# Patient Record
(demographics unavailable — no encounter records)

---

## 2021-07-14 NOTE — XRAY REPORT
CHEST 1 VIEW



INDICATION: 

sepsis.



COMPARISON: 

7/15/2020.



FINDINGS:



Support devices: None.

Heart: Normal. 

Lungs/Pleura: No acute pulmonary or pleural findings.  







IMPRESSION:

1. No acute findings.



Signer Name: Zack Villa MD 

Signed: 7/14/2021 3:50 PM

Workstation Name: [id number]

## 2021-07-14 NOTE — CAT SCAN REPORT
CT head/brain wo con



INDICATION:

fall, unsteady gait.



TECHNIQUE:

All CT scans at this location are performed using CT dose reduction for ALARA by means of automated e
xposure control. 



COMPARISON:

None available.



FINDINGS:

There is no evidence of hemorrhage, hydrocephalus, brain edema, or mass effect/mass lesion. There is 
chronic encephalomalacia in the anterior left temporal lobe likely due to previous trauma. Remainder 
the brain otherwise appears normal for age.



The included paranasal sinuses and mastoid air cells are clear. The orbits appear unremarkable.



IMPRESSION:

1. No acute intracranial abnormality. 



Signer Name: Kenneth Garcia MD 

Signed: 7/14/2021 3:34 PM

Workstation Name: [id number]

## 2021-07-14 NOTE — HISTORY AND PHYSICAL REPORT
History of Present Illness


Date of examination: 07/14/21


Date of admission: 


07/14/21 17:10





Chief complaint: 





High blood glucose levels


History of present illness: 


34-year-old male with history of insulin-dependent diabetes and severe 

noncompliance lives in a group home.  Patient is a very poor historian.  Patient

was sent for high blood glucose levels.  In the emergency room patient was found

to have unsteady gait which has been there for more than a year.  Patient has a 

shuffling gait.  Patient stated that several years been walking for the last 1 

to 1-1/2 years.  Smokes about a pack a day.  He has numbness and tingling in 

both hands and feet.  No fever or chills.  Has poor dentition.








- Past Medical History


--Diabetes: Yes


Additional medical history: Left perianal abscess





- Surgical History


Additional Surgical History: Incision and drainage perianal abscess





- Social History


Smoking Status: Current Every Day Smoker





- Medications


Home Medications: 


                                Home Medications











 Medication  Instructions  Recorded  Confirmed  Last Taken  Type


 


Balsalazide Disodium [Colazal] 750 mg PO TID 06/01/20 07/02/20 Unknown History


 


Potassium Chloride [K-Dur] 10 meq PO BID 06/01/20 07/02/20 Unknown History


 


ursodioL [Ursodiol] 300 mg PO BID 06/01/20 07/02/20 Unknown History


 


Acetaminophen [Acetaminophen TAB] 650 mg PO Q4H PRN  tablet 06/05/20 07/02/20 

Unknown Rx


 


Zinc Oxide 1 applic TP BID #7 tube 06/13/20 07/02/20 Unknown Rx


 


oxyCODONE /ACETAMINOPHEN [Percocet 1 tab PO Q6H PRN #14 tablet 07/05/20  Unknown

 Rx





5/325 mg]     


 


Potassium Chloride 20 meq PO QDAY #14 packet 07/15/20  Unknown Rx


 


Acetaminophen [Acetaminophen TAB] 650 mg PO Q4H PRN  tablet 08/19/20  Unknown Rx


 


Insulin Detemir [Levemir VIAL] 25 unit SQ QHS #1 vial 08/19/20  Unknown Rx


 


Insulin NPH/Regular [NovoLIN 70/30] 4 unit SUB-Q BID #1 vial 08/19/20  Unknown 

Rx


 


Loperamide [Imodium] 2 mg PO TID #90 capsule 08/19/20  Unknown Rx


 


Magnesium Oxide [Mag-Ox] 400 mg PO QDAY #14 tablet 08/19/20  Unknown Rx


 


Nicotine [Habitrol] 7 mg TD QDAY #30 patch 08/19/20  Unknown Rx


 


OLANzapine [ZyPREXA] 5 mg PO DAILY #30 tablet 08/19/20  Unknown Rx


 


Tamsulosin [Flomax] 0.4 mg PO QDAY #30 cap 08/19/20  Unknown Rx














Review of Systems


ROS: 


Stated complaint: GENERAL ILLNESS


Other details as noted in HPI





Constitutional: denies: chills, fever


Eyes: denies: vision change


ENT: dental pain.  denies: throat pain, congestion


Respiratory: cough, shortness of breath


Cardiovascular: denies: chest pain, palpitations, syncope


Gastrointestinal: denies: abdominal pain, nausea, vomiting, diarrhea


Genitourinary: denies: dysuria, frequency


Musculoskeletal: denies: back pain, joint swelling


Skin: lesions


Neurological: paresthesias, abnormal gait.  denies: headache, weakness, numbness








Medications and Allergies


                                    Allergies











Allergy/AdvReac Type Severity Reaction Status Date / Time


 


Sulfa (Sulfonamide Allergy  Rash Verified 07/07/20 09:28





Antibiotics)     











                                Home Medications











 Medication  Instructions  Recorded  Confirmed  Last Taken  Type


 


Balsalazide Disodium [Colazal] 750 mg PO TID 06/01/20 07/15/21 07/13/21 History


 


Potassium Chloride [K-Dur] 10 meq PO BID 06/01/20 07/15/21 07/13/21 History


 


ursodioL [Ursodiol] 300 mg PO BID 06/01/20 07/15/21 07/13/21 History


 


Acetaminophen [Acetaminophen TAB] 650 mg PO Q4H PRN  tablet 06/05/20 07/15/21 07

/13/21 Rx


 


Zinc Oxide 1 applic TP BID #7 tube 06/13/20 07/15/21 07/15/21 03:49 Rx


 


oxyCODONE /ACETAMINOPHEN [Percocet 1 tab PO Q6H PRN #14 tablet 07/05/20 07/15/21

 07/13/21 Rx





5/325 mg]     


 


Potassium Chloride 20 meq PO QDAY #14 packet 07/15/20 07/15/21 07/13/21 Rx


 


Acetaminophen [Acetaminophen TAB] 650 mg PO Q4H PRN  tablet 08/19/20 07/15/21 

07/13/21 Rx


 


Insulin Detemir [Levemir VIAL] 25 unit SQ QHS #1 vial 08/19/20 07/15/21 07/13/21

Rx


 


Insulin NPH/Regular [NovoLIN 70/30] 4 unit SUB-Q BID #1 vial 08/19/20 07/15/21 

07/12/21 Rx


 


Loperamide [Imodium] 2 mg PO TID #90 capsule 08/19/20 07/15/21 07/13/21 Rx


 


Magnesium Oxide [Mag-Ox] 400 mg PO QDAY #14 tablet 08/19/20 07/15/21 07/13/21 Rx


 


Nicotine [Habitrol] 7 mg TD QDAY #30 patch 08/19/20 07/15/21 07/13/21 Rx


 


OLANzapine [ZyPREXA] 5 mg PO DAILY #30 tablet 08/19/20 07/15/21 07/13/21 Rx


 


Tamsulosin [Flomax] 0.4 mg PO QDAY #30 cap 08/19/20 07/15/21 07/13/21 Rx











Active Meds: 


Active Medications





Dextrose (Dextrose 50% In Water (25gm) 50 Ml Syringe)  50 ml IV Q30MIN PRN; 

Protocol


   PRN Reason: Hypoglycemia


Insulin Human Regular (Insulin Regular, Human 100 Units/1 Ml)  0 units SUB-Q 

[facility]; Protocol


   Last Admin: 07/14/21 20:05 Dose:  Not Given


   Documented by: 











Exam





- Constitutional


Vitals: 


                                        











Temp Pulse Resp BP Pulse Ox


 


 98.9 F   87   19   112/78   99 


 


 07/14/21 20:51  07/14/21 20:51  07/14/21 20:51  07/14/21 20:51  07/14/21 20:51











General appearance: Present: no acute distress, well-nourished





- EENT


Eyes: Present: PERRL


ENT: hearing intact, clear oral mucosa





- Neck


Neck: Present: supple, normal ROM





- Respiratory


Respiratory effort: normal


Respiratory: bilateral: CTA





- Cardiovascular


Heart rate: 78


Rhythm: regular


Heart Sounds: Present: S1 & S2.  Absent: rub, click





- Extremities


Extremities: pulses symmetrical, No edema


Peripheral Pulses: within normal limits





- Abdominal


General gastrointestinal: Present: soft, non-tender, non-distended, normal bowel

 sounds


Male genitourinary: Present: normal





- Integumentary


Integumentary: Present: clear, warm, dry





- Musculoskeletal


Musculoskeletal: strength equal bilaterally, generalized weakness





- Psychiatric


Psychiatric: appropriate mood/affect, intact judgment & insight





- Neurologic


Neurologic: CNII-XII intact, moves all extremities, other (Abnormal 

gait--shuffles but not ataxic)





- Allied Health


Allied health notes reviewed: nursing, case management





HEART Score





- HEART Score


Troponin: 


                                        











Troponin T  < 0.010 ng/mL (0.00-0.029)   07/14/21  14:15    














Results





- Labs


CBC & Chem 7: 


                                 07/14/21 14:15





                                 07/14/21 14:15


Labs: 


                             Laboratory Last Values











WBC  13.8 K/mm3 (4.5-11.0)  H  07/14/21  14:15    


 


RBC  4.87 M/mm3 (3.65-5.03)   07/14/21  14:15    


 


Hgb  15.1 gm/dl (11.8-15.2)   07/14/21  14:15    


 


Hct  42.1 % (35.5-45.6)   07/14/21  14:15    


 


MCV  87 fl (84-94)   07/14/21  14:15    


 


MCH  31 pg (28-32)   07/14/21  14:15    


 


MCHC  36 % (32-34)  H  07/14/21  14:15    


 


RDW  16.0 % (13.2-15.2)  H  07/14/21  14:15    


 


Plt Count  154 K/mm3 (140-440)   07/14/21  14:15    


 


Lymph % (Auto)  11.7 % (13.4-35.0)  L  07/14/21  14:15    


 


Mono % (Auto)  4.6 % (0.0-7.3)   07/14/21  14:15    


 


Eos % (Auto)  0.7 % (0.0-4.3)   07/14/21  14:15    


 


Baso % (Auto)  0.3 % (0.0-1.8)   07/14/21  14:15    


 


Lymph # (Auto)  1.6 K/mm3 (1.2-5.4)   07/14/21  14:15    


 


Mono # (Auto)  0.6 K/mm3 (0.0-0.8)   07/14/21  14:15    


 


Eos # (Auto)  0.1 K/mm3 (0.0-0.4)   07/14/21  14:15    


 


Baso # (Auto)  0.0 K/mm3 (0.0-0.1)   07/14/21  14:15    


 


Seg Neutrophils %  82.7 % (40.0-70.0)  H  07/14/21  14:15    


 


Seg Neutrophils #  11.4 K/mm3 (1.8-7.7)  H  07/14/21  14:15    


 


PT  13.3 Sec. (12.2-14.9)   07/14/21  14:15    


 


INR  0.96  (0.87-1.13)   07/14/21  14:15    


 


APTT  25.5 Sec. (24.2-36.6)   07/14/21  14:15    


 


VBG pH  7.438  (7.320-7.420)  H  07/14/21  14:41    


 


Sodium  132 mmol/L (137-145)  L  07/14/21  14:15    


 


Potassium  3.3 mmol/L (3.6-5.0)  L  07/14/21  14:15    


 


Chloride  87.0 mmol/L ()  L  07/14/21  14:15    


 


Carbon Dioxide  32 mmol/L (22-30)  H  07/14/21  14:15    


 


Anion Gap  16 mmol/L  07/14/21  14:15    


 


BUN  12 mg/dL (9-20)   07/14/21  14:15    


 


Creatinine  0.5 mg/dL (0.8-1.3)  L  07/14/21  14:15    


 


Estimated GFR  > 60 ml/min  07/14/21  14:15    


 


BUN/Creatinine Ratio  24 %  07/14/21  14:15    


 


Glucose  439 mg/dL ()  H  07/14/21  14:15    


 


POC Glucose  401 mg/dL ()  H  07/14/21  21:17    


 


Lactic Acid  0.90 mmol/L (0.7-2.0)   07/14/21  17:08    


 


Calcium  9.5 mg/dL (8.4-10.2)   07/14/21  14:15    


 


Magnesium  1.10 mg/dL (1.7-2.3)  L  07/14/21  14:15    


 


Total Bilirubin  1.00 mg/dL (0.1-1.2)   07/14/21  14:15    


 


AST


  295 units/L (5-40)  H  07/14/21  14:15    


 


ALT  226 units/L (7-56)  H  07/14/21  14:15    


 


Alkaline Phosphatase  1367 units/L ()  H  07/14/21  14:15    


 


Troponin T  < 0.010 ng/mL (0.00-0.029)   07/14/21  14:15    


 


Total Protein  7.3 g/dL (6.3-8.2)   07/14/21  14:15    


 


Albumin  3.7 g/dL (3.9-5)  L  07/14/21  14:15    


 


Albumin/Globulin Ratio  1.0 %  07/14/21  14:15    


 


Lipase  28 units/L (13-60)   07/14/21  14:15    


 


Urine Color  Straw  (Yellow)   07/14/21  19:22    


 


Urine Turbidity  Clear  (Clear)   07/14/21  19:22    


 


Urine pH  7.0  (5.0-7.0)   07/14/21  19:22    


 


Ur Specific Gravity  1.022  (1.003-1.030)   07/14/21  19:22    


 


Urine Protein  <15 mg/dl mg/dL (Negative)   07/14/21  19:22    


 


Urine Glucose (UA)  >=500 mg/dL (Negative)   07/14/21  19:22    


 


Urine Ketones  Tr mg/dL (Negative)   07/14/21  19:22    


 


Urine Blood  Neg  (Negative)   07/14/21  19:22    


 


Urine Nitrite  Neg  (Negative)   07/14/21  19:22    


 


Urine Bilirubin  Neg  (Negative)   07/14/21  19:22    


 


Urine Urobilinogen  < 2.0 mg/dL (<2.0)   07/14/21  19:22    


 


Ur Leukocyte Esterase  Neg  (Negative)   07/14/21  19:22    


 


Urine WBC (Auto)  < 1.0 /HPF (0.0-6.0)   07/14/21  19:22    


 


Urine RBC (Auto)  1.0 /HPF (0.0-6.0)   07/14/21  19:22    


 


Urine Opiates Screen  Negative   07/14/21  19:22    


 


Urine Methadone Screen  Negative   07/14/21  19:22    


 


Ur Barbiturates Screen  Negative   07/14/21  19:22    


 


Ur Phencyclidine Scrn  Negative   07/14/21  19:22    


 


Ur Amphetamines Screen  Negative   07/14/21  19:22    


 


U Benzodiazepines Scrn  Negative   07/14/21  19:22    


 


Urine Cocaine Screen  Positive   07/14/21  19:22    


 


U Marijuana (THC) Screen  Negative   07/14/21  19:22    


 


Drugs of Abuse Note  Disclamer   07/14/21  19:22    











Microbiology: 


Microbiology





07/14/21 14:15   Peripheral/Venous   Blood Culture - Preliminary


                            Culture in Progress


07/14/21 14:15   Peripheral/Venous   Blood Culture - Preliminary


                            Culture in Progress











- Imaging and Cardiology


Imaging and Cardiology: 





Chest x-ray


No acute findings





Head CT


No acute intracranial abnormalities





Cervical C-spine


No acute fracture or subluxation of the spine and neutral position





Abdominal ultrasound


There is sludge in the lumen of the gallbladder


The common bile duct is in the upper limits of normal in diameter


There is no definitive sonographic evidence of cholecystitis.





Assessment and Plan


Advance Directives: Yes (Full code)


VTE prophylaxis?: Chemical


Plan of care discussed with patient/family: Yes





- Patient Problems


(1) Hyperosmolar hyperglycemic state (HHS)


Current Visit: Yes   Status: Acute   


Plan to address problem: 


Patient is very noncompliant


Humalog at treatment doses and high-dose sliding scale coverage


Initiated on insulin twice a day


Check a hemoglobin A1c








(2) Abnormality of gait


Current Visit: Yes   Status: Chronic   


Plan to address problem: 


Reason is unclear


LS findings CT scan requested


Neuro consult requested


CAT scan of the C-spine is normal








(3) Hypokalemia


Current Visit: Yes   Status: Acute   


Plan to address problem: 


Supplemented








(4) Hypomagnesemia


Current Visit: Yes   Status: Acute   


Plan to address problem: 


Supplemented








(5) Transaminitis


Current Visit: Yes   Status: Acute   


Plan to address problem: 


Check hepatitis profile








(6) Cocaine dependence


Current Visit: Yes   Status: Chronic   


Qualifiers: 


   Substance use status: uncomplicated   Qualified Code(s): F14.20 - Cocaine 

dependence, uncomplicated   


Plan to address problem: 


Patient counseled








(7) DVT prophylaxis


Current Visit: Yes   Status: Acute   


Plan to address problem: 


On heparin and GI prophylaxis

## 2021-07-14 NOTE — CAT SCAN REPORT
CT CERVICAL SPINE WITHOUT CONTRAST



INDICATION:

fall, unsteady gait.



TECHNIQUE:

Axial CT images of the spine were obtained. Sagittal and coronal reformatted images were produced. Al
l CT scans at this location are performed using CT dose reduction for ALARA by means of automated exp
osure control. 



COMPARISON:

None available.



FINDINGS:

ACUTE FRACTURE(S) OR SUBLUXATION: None.



SPINAL DEGENERATIVE CHANGES: No significant degenerative changes.



PARASPINAL SOFT TISSUES: No soft tissue swelling or other acute abnormalities. 



ADDITIONAL FINDINGS: No significant additional findings.



IMPRESSION:

1. No acute fracture or subluxation in the spine in neutral position.



Signer Name: Kenneth Garcia MD 

Signed: 7/14/2021 3:35 PM

Workstation Name: [id number]

## 2021-07-14 NOTE — ULTRASOUND REPORT
ULTRASOUND ABDOMEN, LIMITED (RIGHT UPPER QUADRANT)



INDICATION:

RUQ, r/o cholecystitis.



COMPARISON:

CT scan dated 6/8/2020



FINDINGS:

Pancreas: Not well seen. Visualized portion shows no significant abnormality.

Liver: Normal.

Gallbladder: Sludge is noted in the lumen of the gallbladder.  The gallbladder wall is normal in thic
kness. No pericholecystic fluid is seen.

Bile ducts: Upper limits of normal Common Bile Duct measures 6.6 mm. 



Free fluid: None.

Additional Findings: Incidental note is made of an extrarenal pelvis in the right kidney. This is see
n on the prior CT.



IMPRESSION:

1. There is sludge in the lumen of the gallbladder. The common bile duct is upper limits of normal in
 diameter. There is no definitive sonographic evidence of cholecystitis. If cholecystitis remains a c
linical concern, nuclear medicine HIDA scan can be obtained to further evaluate.



Signer Name: Klaus June MD 

Signed: 7/14/2021 6:30 PM

Workstation Name: [id number]

## 2021-07-14 NOTE — EMERGENCY DEPARTMENT REPORT
HPI





- General


Chief Complaint: Hyperglycemia


Time Seen by Provider: 07/14/21 13:10





- HPI


HPI: 





34-year-old male with history of insulin-dependent diabetes mellitus and several

mental illnesses including schizophrenia who is a gentile of the [facility] brought in b

y EMS from his group home at the request of his court appointed guardian due to 

multiple problems she is noted.  His blood sugars have been too high to read for

the past week or so.  Over that period of time he has had an unsteady gait and 

has fallen several times including yesterday.  She is also concerned that he has

developed neuropathy because he reports numbness in his fingers and toes.  When 

I spoke to the patient initially, he indicated he did not want to be examined or

worked up in any way.  He would not allow me to inspect his feet or hands or 

perform any kind of work-up and insisted that he was okay.





After speaking to the patient's guardian, Carmela Lambert on the phone at 1:24 PM, 

she stated that she agrees he should not be forced to undergo evaluation against

his will.  





We contacted risk management and I spoke with Kaykay Townsend at 1:49 PM.  She 

expressed agreement that we should not force anyone to undergo examination or 

work-up against their will, even if they are a gentile of the [facility].





When I went into the room to speak to the patient again at 1:53 PM, Lucia, the 

ER manager was present and the patient is now change his mind and states he is 

okay with being examined and fully worked up and treated.  He reports that he 

has had an unsteady gait for the past week and has been falling frequently.  He 

also reports dental pain which has been ongoing for a year.  He does state that 

for the past week he has had some shortness of breath and cough greater than 

normal.  He is a cigarette smoker and smokes 1 pack/day.  He says he has 

numbness/tingling in his bilateral fingertips and toes.  He denies any 

fever/chills, headache, vision change, neck pain, back pain, chest pain, 

abdominal pain, nausea/vomiting, dysuria, focal weakness, or any other 

complaints.





ED Past Medical Hx





- Past Medical History


Hx Hypertension: No


Hx Heart Attack/AMI: No


Hx Congestive Heart Failure: No


Hx Diabetes: Yes


Hx Deep Vein Thrombosis: No


Hx Pulmonary Embolism: No


Hx Liver Disease: No


Hx Renal Disease: No


Hx Sickle Cell Disease: No


Hx Arthritis: No


Hx Seizures: No


Hx Asthma: No


Hx COPD: No


Hx HIV: No


Additional medical history: Left perianal abscess





- Surgical History


Hx Pacemaker: No


Hx Internal Defibrillator: No


Hx Cholecystectomy: No


Hx Appendectomy: No


Additional Surgical History: Incision and drainage perianal abscess





- Social History


Smoking Status: Current Every Day Smoker





- Medications


Home Medications: 


                                Home Medications











 Medication  Instructions  Recorded  Confirmed  Last Taken  Type


 


Balsalazide Disodium [Colazal] 750 mg PO TID 06/01/20 07/02/20 Unknown History


 


Potassium Chloride [K-Dur] 10 meq PO BID 06/01/20 07/02/20 Unknown History


 


ursodioL [Ursodiol] 300 mg PO BID 06/01/20 07/02/20 Unknown History


 


Acetaminophen [Acetaminophen TAB] 650 mg PO Q4H PRN  tablet 06/05/20 07/02/20 

Unknown Rx


 


Zinc Oxide 1 applic TP BID #7 tube 06/13/20 07/02/20 Unknown Rx


 


oxyCODONE /ACETAMINOPHEN [Percocet 1 tab PO Q6H PRN #14 tablet 07/05/20  Unknown

 Rx





5/325 mg]     


 


Potassium Chloride 20 meq PO QDAY #14 packet 07/15/20  Unknown Rx


 


Acetaminophen [Acetaminophen TAB] 650 mg PO Q4H PRN  tablet 08/19/20  Unknown Rx


 


Insulin Detemir [Levemir VIAL] 25 unit SQ QHS #1 vial 08/19/20  Unknown Rx


 


Insulin NPH/Regular [NovoLIN 70/30] 4 unit SUB-Q BID #1 vial 08/19/20  Unknown 

Rx


 


Loperamide [Imodium] 2 mg PO TID #90 capsule 08/19/20  Unknown Rx


 


Magnesium Oxide [Mag-Ox] 400 mg PO QDAY #14 tablet 08/19/20  Unknown Rx


 


Nicotine [Habitrol] 7 mg TD QDAY #30 patch 08/19/20  Unknown Rx


 


OLANzapine [ZyPREXA] 5 mg PO DAILY #30 tablet 08/19/20  Unknown Rx


 


Tamsulosin [Flomax] 0.4 mg PO QDAY #30 cap 08/19/20  Unknown Rx














ED Review of Systems


ROS: 


Stated complaint: GENERAL ILLNESS


Other details as noted in HPI





Constitutional: denies: chills, fever


Eyes: denies: vision change


ENT: dental pain.  denies: throat pain, congestion


Respiratory: cough, shortness of breath


Cardiovascular: denies: chest pain, palpitations, syncope


Gastrointestinal: denies: abdominal pain, nausea, vomiting, diarrhea


Genitourinary: denies: dysuria, frequency


Musculoskeletal: denies: back pain, joint swelling


Skin: lesions


Neurological: paresthesias, abnormal gait.  denies: headache, weakness, numbness





Physical Exam





- Physical Exam


Vital Signs: 


                                   Vital Signs











  07/14/21 07/14/21 07/14/21





  14:20 16:20 18:46


 


Temperature 99.0 F  


 


Pulse Rate 68 88 98 H


 


Respiratory 18 18 15





Rate   


 


Blood Pressure   90/58


 


Blood Pressure 98/66 100/64 





[Left]   


 


O2 Sat by Pulse 99  93





Oximetry   














  07/14/21 07/14/21





  19:00 19:16


 


Temperature  


 


Pulse Rate 88 86


 


Respiratory 17 17





Rate  


 


Blood Pressure 92/68 92/68


 


Blood Pressure  





[Left]  


 


O2 Sat by Pulse 97 98





Oximetry  











Physical Exam: 





GENERAL: Frail unkempt male in no acute distress.


HEENT: Normocephalic. No obvious signs of trauma. Dry mucous membranes.  

Intraoral examination reveals very poor dentition including multiple gangrenous 

and necrotic teeth.  There is an impacted right molar which is seen.  There is 

no obvious drainable periapical abscess.


EYES: Extraocular movements are intact. Pupils are equal round and reactive to 

light bilaterally


NECK: Supple. FROM is intact. Trachea is midline.


LUNGS: Nonlabored breathing. Equal chest rise bilaterally.  Lung auscultation 

reveals globally decreased air movement throughout without discrete rales, 

wheezes, or rhonchi.


HEART/CARDIOVASCULAR: Tachycardic but with regular rate. No murmurs or rubs.


VASCULAR: 2+ peripheral pulses. Cap refill < 2 seconds


ABDOMEN: Abdomen is soft and nondistended. There is no significant tenderness, 

guarding or rebound.


SKIN: Skin is warm and dry.  There are multiple scattered 1 to 2 mm erythematous

 macules which are excoriated over the bilateral arms and legs sparing the palms

 and soles.


NEURO: Patient is awake, alert, and oriented to self place, and situation, 

although he is confused about the date.  CNs II-XII grossly intact. No focal 

deficits. Normal motor and sensory exam throughout with the exception of 

decreased sensation over the bilateral fingertips and toes. normal speech.  No

rmal finger-nose-finger. Wide unsteady gait


MUSCULOSKELETAL: No obvious deformities. No significant tenderness. Normal ROM 

throughout. 


BACK/SPINE: No midline tenderness or step-offs of the C/T/L spine. No 

costovertebral angle tenderness.





ED Medical Decision Making





- Lab Data


Result diagrams: 


                                 07/14/21 14:15





                                 07/14/21 14:15





                                   Lab Results











  07/14/21 07/14/21 07/14/21 Range/Units





  14:15 14:15 14:15 


 


WBC    13.8 H  (4.5-11.0)  K/mm3


 


RBC    4.87  (3.65-5.03)  M/mm3


 


Hgb    15.1  (11.8-15.2)  gm/dl


 


Hct    42.1  (35.5-45.6)  %


 


MCV    87  (84-94)  fl


 


MCH    31  (28-32)  pg


 


MCHC    36 H  (32-34)  %


 


RDW    16.0 H  (13.2-15.2)  %


 


Plt Count    154  (140-440)  K/mm3


 


Lymph % (Auto)    11.7 L  (13.4-35.0)  %


 


Mono % (Auto)    4.6  (0.0-7.3)  %


 


Eos % (Auto)    0.7  (0.0-4.3)  %


 


Baso % (Auto)    0.3  (0.0-1.8)  %


 


Lymph # (Auto)    1.6  (1.2-5.4)  K/mm3


 


Mono # (Auto)    0.6  (0.0-0.8)  K/mm3


 


Eos # (Auto)    0.1  (0.0-0.4)  K/mm3


 


Baso # (Auto)    0.0  (0.0-0.1)  K/mm3


 


Seg Neutrophils %    82.7 H  (40.0-70.0)  %


 


Seg Neutrophils #    11.4 H  (1.8-7.7)  K/mm3


 


PT  13.3    (12.2-14.9)  Sec.


 


INR  0.96    (0.87-1.13)  


 


APTT  25.5    (24.2-36.6)  Sec.


 


VBG pH     (7.320-7.420)  


 


Sodium     (137-145)  mmol/L


 


Potassium     (3.6-5.0)  mmol/L


 


Chloride     ()  mmol/L


 


Carbon Dioxide     (22-30)  mmol/L


 


Anion Gap     mmol/L


 


BUN     (9-20)  mg/dL


 


Creatinine     (0.8-1.3)  mg/dL


 


Estimated GFR     ml/min


 


BUN/Creatinine Ratio     %


 


Glucose     ()  mg/dL


 


POC Glucose     ()  mg/dL


 


Lactic Acid     (0.7-2.0)  mmol/L


 


Calcium     (8.4-10.2)  mg/dL


 


Magnesium     (1.7-2.3)  mg/dL


 


Total Bilirubin     (0.1-1.2)  mg/dL


 


AST     (5-40)  units/L


 


ALT     (7-56)  units/L


 


Alkaline Phosphatase     ()  units/L


 


Troponin T   < 0.010   (0.00-0.029)  ng/mL


 


Total Protein     (6.3-8.2)  g/dL


 


Albumin     (3.9-5)  g/dL


 


Albumin/Globulin Ratio     %


 


Lipase     (13-60)  units/L














  07/14/21 07/14/21 07/14/21 Range/Units





  14:15 14:15 14:15 


 


WBC     (4.5-11.0)  K/mm3


 


RBC     (3.65-5.03)  M/mm3


 


Hgb     (11.8-15.2)  gm/dl


 


Hct     (35.5-45.6)  %


 


MCV     (84-94)  fl


 


MCH     (28-32)  pg


 


MCHC     (32-34)  %


 


RDW     (13.2-15.2)  %


 


Plt Count     (140-440)  K/mm3


 


Lymph % (Auto)     (13.4-35.0)  %


 


Mono % (Auto)     (0.0-7.3)  %


 


Eos % (Auto)     (0.0-4.3)  %


 


Baso % (Auto)     (0.0-1.8)  %


 


Lymph # (Auto)     (1.2-5.4)  K/mm3


 


Mono # (Auto)     (0.0-0.8)  K/mm3


 


Eos # (Auto)     (0.0-0.4)  K/mm3


 


Baso # (Auto)     (0.0-0.1)  K/mm3


 


Seg Neutrophils %     (40.0-70.0)  %


 


Seg Neutrophils #     (1.8-7.7)  K/mm3


 


PT     (12.2-14.9)  Sec.


 


INR     (0.87-1.13)  


 


APTT     (24.2-36.6)  Sec.


 


VBG pH     (7.320-7.420)  


 


Sodium  132 L    (137-145)  mmol/L


 


Potassium  3.3 L    (3.6-5.0)  mmol/L


 


Chloride  87.0 L    ()  mmol/L


 


Carbon Dioxide  32 H    (22-30)  mmol/L


 


Anion Gap  16    mmol/L


 


BUN  12    (9-20)  mg/dL


 


Creatinine  0.5 L    (0.8-1.3)  mg/dL


 


Estimated GFR  > 60    ml/min


 


BUN/Creatinine Ratio  24    %


 


Glucose  439 H    ()  mg/dL


 


POC Glucose     ()  mg/dL


 


Lactic Acid   1.30   (0.7-2.0)  mmol/L


 


Calcium  9.5    (8.4-10.2)  mg/dL


 


Magnesium    1.10 L  (1.7-2.3)  mg/dL


 


Total Bilirubin  1.00    (0.1-1.2)  mg/dL


 


AST  295 H    (5-40)  units/L


 


ALT  226 H    (7-56)  units/L


 


Alkaline Phosphatase  1367 H    ()  units/L


 


Troponin T     (0.00-0.029)  ng/mL


 


Total Protein  7.3    (6.3-8.2)  g/dL


 


Albumin  3.7 L    (3.9-5)  g/dL


 


Albumin/Globulin Ratio  1.0    %


 


Lipase    28  (13-60)  units/L














  07/14/21 07/14/21 07/14/21 Range/Units





  14:41 14:41 17:08 


 


WBC     (4.5-11.0)  K/mm3


 


RBC     (3.65-5.03)  M/mm3


 


Hgb     (11.8-15.2)  gm/dl


 


Hct     (35.5-45.6)  %


 


MCV     (84-94)  fl


 


MCH     (28-32)  pg


 


MCHC     (32-34)  %


 


RDW     (13.2-15.2)  %


 


Plt Count     (140-440)  K/mm3


 


Lymph % (Auto)     (13.4-35.0)  %


 


Mono % (Auto)     (0.0-7.3)  %


 


Eos % (Auto)     (0.0-4.3)  %


 


Baso % (Auto)     (0.0-1.8)  %


 


Lymph # (Auto)     (1.2-5.4)  K/mm3


 


Mono # (Auto)     (0.0-0.8)  K/mm3


 


Eos # (Auto)     (0.0-0.4)  K/mm3


 


Baso # (Auto)     (0.0-0.1)  K/mm3


 


Seg Neutrophils %     (40.0-70.0)  %


 


Seg Neutrophils #     (1.8-7.7)  K/mm3


 


PT     (12.2-14.9)  Sec.


 


INR     (0.87-1.13)  


 


APTT     (24.2-36.6)  Sec.


 


VBG pH  7.438 H    (7.320-7.420)  


 


Sodium     (137-145)  mmol/L


 


Potassium     (3.6-5.0)  mmol/L


 


Chloride     ()  mmol/L


 


Carbon Dioxide     (22-30)  mmol/L


 


Anion Gap     mmol/L


 


BUN     (9-20)  mg/dL


 


Creatinine     (0.8-1.3)  mg/dL


 


Estimated GFR     ml/min


 


BUN/Creatinine Ratio     %


 


Glucose     ()  mg/dL


 


POC Glucose   433 H   ()  mg/dL


 


Lactic Acid    0.90  (0.7-2.0)  mmol/L


 


Calcium     (8.4-10.2)  mg/dL


 


Magnesium     (1.7-2.3)  mg/dL


 


Total Bilirubin     (0.1-1.2)  mg/dL


 


AST     (5-40)  units/L


 


ALT     (7-56)  units/L


 


Alkaline Phosphatase     ()  units/L


 


Troponin T     (0.00-0.029)  ng/mL


 


Total Protein     (6.3-8.2)  g/dL


 


Albumin     (3.9-5)  g/dL


 


Albumin/Globulin Ratio     %


 


Lipase     (13-60)  units/L














- EKG Data


-: EKG Interpreted by Me





- EKG Data





07/14/21 19:30


Normal sinus rhythm.  Normal axis.  Normal intervals.  No ectopy.  No sign

ificant ST segment or T wave abnormalities.





- Radiology Data





CT head/brain wo con  INDICATION: fall, unsteady gait.  TECHNIQUE: All CT scans 

at this location are performed using CT dose reduction for ALARA by means of 

automated exposure control.  COMPARISON: None available.  FINDINGS: There is no 

evidence of hemorrhage, hydrocephalus, brain edema, or mass effect/mass lesion. 

There is chronic encephalomalacia in the anterior left temporal lobe likely due 

to previous trauma. Remainder the brain otherwise appears normal for age.  The 

included paranasal sinuses and mastoid air cells are clear. The orbits appear 

unremarkable.  IMPRESSION: 1. No acute intracranial abnormality.  Signer Name: 

Kenneth Garcia MD Signed: 7/14/2021 2:34 PM Workstation Name: [id number]





CT CERVICAL SPINE WITHOUT CONTRAST  INDICATION: fall, unsteady gait.  TECHNIQUE:

 Axial CT images of the spine were obtained. Sagittal and coronal reformatted 

images were produced. All CT scans at this location are performed using CT dose 

reduction for ALARA by means of automated exposure control.  COMPARISON: None 

available.  FINDINGS: ACUTE FRACTURE(S) OR SUBLUXATION: None.  SPINAL 

DEGENERATIVE CHANGES: No significant degenerative changes.  PARASPINAL SOFT 

TISSUES: No soft tissue swelling or other acute abnormalities.  ADDITIONAL 

FINDINGS: No significant additional findings.  IMPRESSION: 1. No acute fracture 

or subluxation in the spine in neutral position.  Signer Name: Kenneth Garcia MD 

Signed: 7/14/2021 2:35 PM Workstation Name: [id number]





CHEST 1 VIEW  INDICATION: sepsis.  COMPARISON: 7/15/2020.  FINDINGS:  Support 

devices: None. Heart: Normal. Lungs/Pleura: No acute pulmonary or pleural 

findings.    IMPRESSION: 1. No acute findings.  Signer Name: Zack Villa MD 

Signed: 7/14/2021 2:50 PM Workstation Name: [id number]





ULTRASOUND ABDOMEN, LIMITED (RIGHT UPPER QUADRANT)  INDICATION: RUQ, r/o 

cholecystitis.  COMPARISON: CT scan dated 6/8/2020  FINDINGS: Pancreas: Not well

 seen. Visualized portion shows no significant abnormality. Liver: Normal. 

Gallbladder: Sludge is noted in the lumen of the gallbladder. The gallbladder 

wall is normal in thickness. No pericholecystic fluid is seen. Bile ducts: Upper

 limits of normal Common Bile Duct measures 6.6 mm.  Free fluid: None. Additi

onal Findings: Incidental note is made of an extrarenal pelvis in the right 

kidney. This is seen on the prior CT.  IMPRESSION: 1. There is sludge in the 

lumen of the gallbladder. The common bile duct is upper limits of normal in 

diameter. There is no definitive sonographic evidence of cholecystitis. If 

cholecystitis remains a clinical concern, nuclear medicine HIDA scan can be 

obtained to further evaluate.  Signer Name: Klaus June MD Signed: 7/14/2021 

5:30 PM Workstation Name: [id number]





- Medical Decision Making





34-year-old male with history of insulin-dependent diabetes mellitus and several

 mental illnesses who is a gentile of the [facility] was brought in by EMS at the 

request of his court-appointed guardian due to elevated blood sugars, unsteady 

gait, and a fall yesterday.  The patient initially refused examination or work-

up of any kind.  See the HPI for details however, he eventually changed his mind

 and decided that he was okay with full evaluation, work-up, and management.  He

 disclosed that he has had an unsteady gait, elevated blood sugars, numbness in 

his fingers and toes and shortness of breath with a mild cough for the past 

week.  On initial assessment, the patient's vital signs were not taken because 

he did not want them.  However when he consented to examination he was noted to 

have tachycardia in the 110s.  He does have numbness in his fingertips and toes 

bilaterally but no other focal neurologic deficits on exam with the exception of

 broad-based unsteady gait.  Findings are most consistent with diabetic 

neuropathy. He is noted to have very poor dentition with multiple necrotic and 

gangrenous teeth.  There is no visible drainable periapical abscess however, 

there is an impacted right molar seen.  I do feel that he has a dental infection

 which will require antibiotics.  He has full range of motion at the neck.  He 

has no mid spinal tenderness.  There are no obvious signs of trauma.  He does 

have very dry mucous membranes.  He is also noted to have excoriated 

erythematous macules on his arms and feet which spare the palms and soles. They 

are nontender.  Given the lack of available information at this time and the 

possibility of DKA/sepsis we will order very broad work-up including full set of

 labs and 2 sets of blood cultures.  We will obtain EKG, chest x-ray and CT of 

the head and C-spine.  His fingerstick blood glucose was 430.  We will give 2 L 

of IV fluids for now.





Labs have resulted and reveal leukocytosis with white blood cell count of 13.8. 

 However, this could be due to hemoconcentration given that his hemoglobin is 

also 15.1.  Chemistry reveals hyponatremia with sodium of 132, hypokalemia with 

potassium of 3.3, elevated bicarb of 32.  Lactate is not elevated.  Troponin is 

negative.  Magnesium is low at 1.1.  Given the low potassium we will not give 

insulin for now but will continue with IV fluids and will give 40 mEq of 

potassium and 2 g of magnesium sulfate.  We will start the patient on sliding 

scale insulin to address his hyperglycemia as well as the IV fluids.  Patient is

 noted to have transaminitis with AST of 295, ALT of 226, and alk phos of 1367, 

which are new.  In light of these findings, although the patient does have a 

nontender abdomen I have ordered a right upper quadrant ultrasound to assess for

 evidence of cholelithiasis/cholecystitis and/or choledocholithiasis.  I have 

ordered broad-spectrum IV vancomycin and ceftriaxone.  Given the patient's 

unsteady gait, significantly elevated blood sugar, evidence of dental infection,

 unidentifiable lesions to the arms and legs, and several electrolyte 

abnormalities, I feel that the patient requires admission for further work-up 

and management.  I discussed this with the patient who expressed understanding 

and agreement with this plan.





I spoke to Dr. Gómez, the on-call hospitalist regarding the case.  He accepts the

 patient for admission and will assume care at approximately 5 PM.  He does 

understand that some studies including the right upper quadrant ultrasound are 

still pending


Critical Care Time: Yes


Critical care time in (mins) excluding proc time.: 40


Critical care attestation.: 


If time is entered above; I have spent that time in minutes in the direct care 

of this critically ill patient, excluding procedure time.


Critical care time was spent in the evaluation/assessment and work-up of 

hyperglycemia with multiple laboratory abnormalities requiring IV repletion and 

IV insulin in addition to several liters of IV fluids





ED Disposition


Clinical Impression: 


 Hyperglycemia, Hypomagnesemia, IDDM (insulin dependent diabetes mellitus), 

Hypokalemia, Diabetic neuropathy, Unsteady gait, Transaminitis, Gallbladder 

sludge, Skin lesion





Disposition: DC-09 OP ADMIT IP TO THIS HOSP


Is pt being admited?: Yes


Condition: Stable

## 2021-07-15 NOTE — CONSULTATION
History of Present Illness


Consult date: 07/15/21


Reason for Consult: Gait abnormality


History of present illness: 


High blood glucose levels


History of present illness: 


34-year-old male with history of insulin-dependent diabetes and severe 

noncompliance lives in a group home.  Patient is a very poor historian.  Patient

was sent for high blood glucose levels.  In the emergency room patient was found

to have unsteady gait which has been there for more than a year.  Patient has a 

shuffling gait as per record according to him her is gradually getting weaker 

and with tendency to fall . 


 Patient denied family hx of similar nature stated that   Smokes about a pack a 

day.  He has numbness and tingling in both hands and feet.  No fever or chills. 

Has poor dentition. and poor neutrition 


in ER UDR is positive for coccain 


-CT brain is unremarkable





- Past Medical History


--Diabetes: Yes


Additional medical history: Left perianal abscess





- Surgical History


Additional Surgical History: Incision and drainage perianal abscess





- Social History


Smoking Status: Current Every Day Smoker





- Medications


Home Medications: 


                                Home Medications











 Medication  Instructions  Recorded  Confirmed  Last Taken  Type


 


Balsalazide Disodium [Colazal] 750 mg PO TID 06/01/20 07/02/20 Unknown History


 


Potassium Chloride [K-Dur] 10 meq PO BID 06/01/20 07/02/20 Unknown History


 


ursodioL [Ursodiol] 300 mg PO BID 06/01/20 07/02/20 Unknown History


 


Acetaminophen [Acetaminophen TAB] 650 mg PO Q4H PRN  tablet 06/05/20 07/02/20 

Unknown Rx


 


Zinc Oxide 1 applic TP BID #7 tube 06/13/20 07/02/20 Unknown Rx


 


oxyCODONE /ACETAMINOPHEN [Percocet 1 tab PO Q6H PRN #14 tablet 07/05/20  Unknown

 Rx





5/325 mg]     


 


Potassium Chloride 20 meq PO QDAY #14 packet 07/15/20  Unknown Rx


 


Acetaminophen [Acetaminophen TAB] 650 mg PO Q4H PRN  tablet 08/19/20  Unknown Rx


 


Insulin Detemir [Levemir VIAL] 25 unit SQ QHS #1 vial 08/19/20  Unknown Rx


 


Insulin NPH/Regular [NovoLIN 70/30] 4 unit SUB-Q BID #1 vial 08/19/20  Unknown 

Rx


 


Loperamide [Imodium] 2 mg PO TID #90 capsule 08/19/20  Unknown Rx


 


Magnesium Oxide [Mag-Ox] 400 mg PO QDAY #14 tablet 08/19/20  Unknown Rx


 


Nicotine [Habitrol] 7 mg TD QDAY #30 patch 08/19/20  Unknown Rx


 


OLANzapine [ZyPREXA] 5 mg PO DAILY #30 tablet 08/19/20  Unknown Rx


 


Tamsulosin [Flomax] 0.4 mg PO QDAY #30 cap 08/19/20  Unknown Rx














Review of Systems


ROS: 


Stated complaint: GENERAL ILLNESS


Other details as noted in HPI





Constitutional: denies: chills, fever


Eyes: denies: vision change


ENT: dental pain.  denies: throat pain, congestion


Respiratory: cough, shortness of breath


Cardiovascular: denies: chest pain, palpitations, syncope


Gastrointestinal: denies: abdominal pain, nausea, vomiting, diarrhea


Genitourinary: denies: dysuria, frequency


Musculoskeletal: denies: back pain, joint swelling


Skin: lesions


Neurological: paresthesias, abnormal gait.  denies: headache, weakness, numbness








Medications and Allergies


                                    Allergies











Allergy/AdvReac Type Severity Reaction Status Date / Time


 


Sulfa (Sulfonamide Allergy  Rash Verified 07/07/20 09:28





Antibiotics)     











                                Home Medications











 Medication  Instructions  Recorded  Confirmed  Last Taken  Type


 


Balsalazide Disodium [Colazal] 750 mg PO TID 06/01/20 07/15/21 07/13/21 History


 


Potassium Chloride [K-Dur] 10 meq PO BID 06/01/20 07/15/21 07/13/21 History


 


ursodioL [Ursodiol] 300 mg PO BID 06/01/20 07/15/21 07/13/21 History


 


Acetaminophen [Acetaminophen TAB] 650 mg PO Q4H PRN  tablet 06/05/20 07/15/21 

07/13/21 Rx


 


Zinc Oxide 1 applic TP BID #7 tube 06/13/20 07/15/21 07/15/21 03:49 Rx


 


oxyCODONE /ACETAMINOPHEN [Percocet 1 tab PO Q6H PRN #14 tablet 07/05/20 07/15/21

 07/13/21 Rx





5/325 mg]     


 


Potassium Chloride 20 meq PO QDAY #14 packet 07/15/20 07/15/21 07/13/21 Rx


 


Acetaminophen [Acetaminophen TAB] 650 mg PO Q4H PRN  tablet 08/19/20 07/15/21 

07/13/21 Rx


 


Insulin Detemir [Levemir VIAL] 25 unit SQ QHS #1 vial 08/19/20 07/15/21 07/13/21

Rx


 


Insulin NPH/Regular [NovoLIN 70/30] 4 unit SUB-Q BID #1 vial 08/19/20 07/15/21 

07/12/21 Rx


 


Loperamide [Imodium] 2 mg PO TID #90 capsule 08/19/20 07/15/21 07/13/21 Rx


 


Magnesium Oxide [Mag-Ox] 400 mg PO QDAY #14 tablet 08/19/20 07/15/21 07/13/21 Rx


 


Nicotine [Habitrol] 7 mg TD QDAY #30 patch 08/19/20 07/15/21 07/13/21 Rx


 


OLANzapine [ZyPREXA] 5 mg PO DAILY #30 tablet 08/19/20 07/15/21 07/13/21 Rx


 


Tamsulosin [Flomax] 0.4 mg PO QDAY #30 cap 08/19/20 07/15/21 07/13/21 Rx











Active Meds: 


Active Medications





Dextrose (Dextrose 50% In Water (25gm) 50 Ml Syringe)  50 ml IV Q30MIN PRN; 

Protocol


   PRN Reason: Hypoglycemia


Insulin Human Regular (Insulin Regular, Human 100 Units/1 Ml)  0 units SUB-Q 

ACHS [facility]; Protocol


   Last Admin: 07/14/21 20:05 Dose:  Not Given


   Documented by: 

















Medications and Allergies


                                    Allergies











Allergy/AdvReac Type Severity Reaction Status Date / Time


 


Sulfa (Sulfonamide Allergy  Rash Verified 07/07/20 09:28





Antibiotics)     











                                Home Medications











 Medication  Instructions  Recorded  Confirmed  Last Taken  Type


 


Balsalazide Disodium [Colazal] 750 mg PO TID 06/01/20 07/15/21 07/13/21 History


 


Potassium Chloride [K-Dur] 10 meq PO BID 06/01/20 07/15/21 07/13/21 History


 


ursodioL [Ursodiol] 300 mg PO BID 06/01/20 07/15/21 07/13/21 History


 


Acetaminophen [Acetaminophen TAB] 650 mg PO Q4H PRN  tablet 06/05/20 07/15/21 

07/13/21 Rx


 


Zinc Oxide 1 applic TP BID #7 tube 06/13/20 07/15/21 07/15/21 03:49 Rx


 


oxyCODONE /ACETAMINOPHEN [Percocet 1 tab PO Q6H PRN #14 tablet 07/05/20 07/15/21

 07/13/21 Rx





5/325 mg]     


 


Potassium Chloride 20 meq PO QDAY #14 packet 07/15/20 07/15/21 07/13/21 Rx


 


Acetaminophen [Acetaminophen TAB] 650 mg PO Q4H PRN  tablet 08/19/20 07/15/21 

07/13/21 Rx


 


Insulin Detemir [Levemir VIAL] 25 unit SQ QHS #1 vial 08/19/20 07/15/21 07/13/21

Rx


 


Insulin NPH/Regular [NovoLIN 70/30] 4 unit SUB-Q BID #1 vial 08/19/20 07/15/21 

07/12/21 Rx


 


Loperamide [Imodium] 2 mg PO TID #90 capsule 08/19/20 07/15/21 07/13/21 Rx


 


Magnesium Oxide [Mag-Ox] 400 mg PO QDAY #14 tablet 08/19/20 07/15/21 07/13/21 Rx


 


Nicotine [Habitrol] 7 mg TD QDAY #30 patch 08/19/20 07/15/21 07/13/21 Rx


 


OLANzapine [ZyPREXA] 5 mg PO DAILY #30 tablet 08/19/20 07/15/21 07/13/21 Rx


 


Tamsulosin [Flomax] 0.4 mg PO QDAY #30 cap 08/19/20 07/15/21 07/13/21 Rx











Active Meds: 


Active Medications





Acetaminophen (Acetaminophen 325 Mg Tab)  650 mg PO Q4H PRN


   PRN Reason: Pain MILD(1-3)/Fever >100.5/HA


   Last Admin: 07/15/21 05:15 Dose:  650 mg


   Documented by: 


Dextrose (Dextrose 50% In Water (25gm) 50 Ml Syringe)  50 ml IV Q30MIN PRN; 

Protocol


   PRN Reason: Hypoglycemia


Insulin Glargine (Insulin Glargine 100 Units/Ml)  25 units SUB-Q QHS [facility]


Insulin Human Isoph/Insulin Regular (Insulin Nph/Regular 70/30 Inj)  15 unit 

SUB-Q BID [facility]


Insulin Human Lispro (Insulin Lispro 100 Unit/Ml)  0 unit SUB-Q ACHS [facility]IR[facility]; 

Protocol


Insulin Human Regular (Insulin Regular, Human 100 Units/1 Ml)  0 units SUB-Q 

[facility]; Protocol


   Last Admin: 07/14/21 23:36 Dose:  Not Given


   Documented by: 


Magnesium Oxide (Magnesium Oxide 400 Mg Tab)  400 mg PO QDAY [facility]


Miscellaneous Medication (Balsalazide Disodium [Colazal])  750 mg PO TID [facility]


Miscellaneous Medication (Ursodiol [Ursodiol])  300 mg PO BID [facility]


Nicotine (Nicotine 7 Mg/24 Hr Patch)  7 mg TD QDAY [facility]


Tamsulosin HCl (Tamsulosin 0.4 Mg Cap)  0.4 mg PO QDAY [facility]











Physical Examination





- Vital Signs


Vital Signs: 


                                   Vital Signs











Temp Pulse Resp BP Pulse Ox


 


 99.0 F   68   18   98/66   99 


 


 07/14/21 14:20  07/14/21 14:20  07/14/21 14:20  07/14/21 14:20  07/14/21 14:20














- Constitutional


General appearance: uncomfortable





- EENT


EENT: Present: PERRL, mucous membranes moist





- Respiratory


Respiratory: Present: chest non-tender, lungs clear, rhonchi





- Cardiovascular


Cardiovascular: Present: regular rate, normal S1, normal S2


Extremities: Present: no peripheral edema bilatateraly, no clubbing, cyanosis





- Gastrointestinal


Gastrointestinal: Present: normoactive bowel sounds





- Integumentary


Integumentary: Present: normal, other (perianal ulcer)





- Neurologic


Cranial nerve examination: PERRL, EOMI, intact


Speech examination: intact


Sensorimotor examination: other (significant weakness in bilateral tricepse more

 R>L , bilateral feet dorsi and Planter version , absent sensation to pin briks 

and positopn sense bilteal gait not done , reflexes are absent in both kness and

 biceps)





Results





- Laboratory Findings


CBC and BMP: 


                                 07/14/21 14:15





                                 07/14/21 14:15


Abnormal Lab Findings: 


                                  Abnormal Labs











  07/14/21 07/14/21 07/14/21





  14:15 14:15 14:15


 


WBC  13.8 H  


 


MCHC  36 H  


 


RDW  16.0 H  


 


Lymph % (Auto)  11.7 L  


 


Seg Neutrophils %  82.7 H  


 


Seg Neutrophils #  11.4 H  


 


VBG pH   


 


Sodium   132 L 


 


Potassium   3.3 L 


 


Chloride   87.0 L 


 


Carbon Dioxide   32 H 


 


Creatinine   0.5 L 


 


Glucose   439 H 


 


POC Glucose   


 


Magnesium    1.10 L


 


AST   295 H 


 


ALT   226 H 


 


Alkaline Phosphatase   1367 H 


 


Albumin   3.7 L 














  07/14/21 07/14/21 07/14/21





  14:41 14:41 18:43


 


WBC   


 


MCHC   


 


RDW   


 


Lymph % (Auto)   


 


Seg Neutrophils %   


 


Seg Neutrophils #   


 


VBG pH  7.438 H  


 


Sodium   


 


Potassium   


 


Chloride   


 


Carbon Dioxide   


 


Creatinine   


 


Glucose   


 


POC Glucose   433 H  449 H


 


Magnesium   


 


AST   


 


ALT   


 


Alkaline Phosphatase   


 


Albumin   














  07/14/21 07/14/21 07/15/21





  19:48 21:17 06:57


 


WBC   


 


MCHC   


 


RDW   


 


Lymph % (Auto)   


 


Seg Neutrophils %   


 


Seg Neutrophils #   


 


VBG pH   


 


Sodium   


 


Potassium   


 


Chloride   


 


Carbon Dioxide   


 


Creatinine   


 


Glucose   


 


POC Glucose  393 H  401 H  285 H


 


Magnesium   


 


AST   


 


ALT   


 


Alkaline Phosphatase   


 


Albumin   














Assessment and Plan





Assessment and Plan


Advance Directives: Yes (Full code)


VTE prophylaxis?: Chemical


Plan of care discussed with patient/family: Yes





- Patient Problems


# Hyperosmolar hyperglycemic state (HHS)


Patient is very noncompliant


Humalog at treatment doses and high-dose sliding scale coverage


Initiated on insulin twice a day


Check a hemoglobin A1c





# Abnormality of gait


-related to progressive advanced neuropathy possibly related to DM and or 

neutrional 


-No family hx is available


-doubt myopathy 


-check cpk


-thiamine supplement


-B1,b12,TSH,A1C,Folate


-Pt and rehab.


-NCS is of value can follow up with neurology as out pt.








# Hypokalemia


Supplemented





# Hypomagnesemia


-Supplemented





# Transaminitis


-Check hepatitis profile





# Cocaine dependence


-Patient counseled


-Brain MRI





# DVT prophylaxis


-On heparin and GI prophylaxis





will follow as needed

## 2021-07-15 NOTE — CAT SCAN REPORT
CT LUMBAR SPINE WITH CONTRAST  

 

HISTORY: Ataxic gait

 

COMPARISON: None 



TECHNIQUE: CT images of the lumbar spine were obtained following 100 mL of Omnipaque 300.  Sagittal a
nd coronal reformats were post-processed.All CT scans at this location are performed using CT dose re
duction for ALARA by means of automated exposure control.





CONTRAST: 100 mL of Omnipaque 300

 

FINDINGS:

Alignment: Normal. No traumatic subluxation.

Vertebrae:No significant abnormality. No fracture.  

Disc Spaces: No significant abnormality allowing for lack of intrathecal contrast.

Facet Joints:No significant abnormality.

Distal spinal cord: Subtle pial enhancement seen in the CT scan



Additional Findings: None

 

IMPRESSION:

 

1.  No significant abnormality.



Signer Name: Valdemar Lam MD 

Signed: 7/15/2021 10:24 AM

Workstation Name: [id number]

## 2021-07-15 NOTE — ELECTROCARDIOGRAPH REPORT
[facility]

                                       

Test Date:    2021               Test Time:    19:26:29

Pat Name:     SOFIA RINCON             Department:   

Patient ID:   [id number]          Room:         05 1

Gender:       M                        Technician:   CRYSTAL

:          1986               Requested By: TAL MERRITT

Order Number: [id number]              Reading MD:   Manuel Solano

                                 Measurements

Intervals                              Axis          

Rate:         82                       P:            69

MI:           144                      QRS:          67

QRSD:         73                       T:            62

QT:           388                                    

QTc:          452                                    

                           Interpretive Statements

Sinus rhythm

Anteroseptal infarct, age indeterminate

No previous ECG available for comparison

Electronically Signed On 7- 10:47:38 EDT by Manuel Solano

## 2021-07-15 NOTE — PROGRESS NOTE
Assessment and Plan


Assessment and plan: 





(1) Hyperosmolar hyperglycemic state (HHS)


Current Visit: Yes   Status: Acute   


Plan to address problem: 


Patient is very noncompliant


Humalog at treatment doses and high-dose sliding scale coverage


Initiated on insulin twice a day


Check a hemoglobin A1c








(2) Abnormality of gait


Current Visit: Yes   Status: Chronic   


Plan to address problem: 


Reason is unclear


LS findings CT scan requested


Neuro consult requested


CAT scan of the C-spine is normal








(3) Hypokalemia


Current Visit: Yes   Status: Acute   


Plan to address problem: 


Supplemented








(4) Hypomagnesemia


Current Visit: Yes   Status: Acute   


Plan to address problem: 


Supplemented








(5) Transaminitis


Current Visit: Yes   Status: Acute   


Plan to address problem: 


Check hepatitis profile








(6) Cocaine dependence


Current Visit: Yes   Status: Chronic   


Qualifiers: 


   Substance use status: uncomplicated   Qualified Code(s): F14.20 - Cocaine 

dependence, uncomplicated   


Plan to address problem: 


Patient counseled








(7) DVT prophylaxis


Current Visit: Yes   Status: Acute   


Plan to address problem: 


On heparin and GI prophylaxis





7/15/2021


-Diabetes mellitus with hyperglycemia; blood sugar is still high.  Patient is on

Lantus and sliding scale insulin.  Hemoglobin A1c is pending.


-UDS is positive for cocaine counseled about cessation of using cocaine


-Neurology was consulted for shuffling gait; CT of the lumbar spine was done and

reading is pending.


-Patient lives in group home.





History


Interval history: 





Patient was seen and evaluated this morning





Hospitalist Physical





- Physical exam


Narrative exam: 





 Not in cardiopulmonary distress. 


 The patient appeared well nourished and normally developed.


 Vital signs as documented.


 Head exam is unremarkable.


 No scleral icterus .


 Neck is without jugular venous distension, thyromegaly, or carotid bruits. 


 Lungs are clear to auscultation.


Cardiac exam reveals regular rate and  Rhythm. 


Abdominal exam reveals normal bowel sounds, nontender, no organomegaly.


Extremities are nonedematous and both femoral and pedal pulses are normal.


CNS: Alert and oriented 3.  No focal weakness.





- Constitutional


Vitals: 


                                        











Temp Pulse Resp BP Pulse Ox


 


 98.2 F   83   18   106/75   97 


 


 07/15/21 05:00  07/15/21 05:00  07/15/21 05:00  07/15/21 05:00  07/15/21 05:00











General appearance: Present: no acute distress, well-nourished





HEART Score





- HEART Score


Troponin: 


                                        











Troponin T  < 0.010 ng/mL (0.00-0.029)   07/14/21  14:15    














Results





- Labs


CBC & Chem 7: 


                                 07/14/21 14:15





                                 07/14/21 14:15


Labs: 


                             Laboratory Last Values











WBC  13.8 K/mm3 (4.5-11.0)  H  07/14/21  14:15    


 


RBC  4.87 M/mm3 (3.65-5.03)   07/14/21  14:15    


 


Hgb  15.1 gm/dl (11.8-15.2)   07/14/21  14:15    


 


Hct  42.1 % (35.5-45.6)   07/14/21  14:15    


 


MCV  87 fl (84-94)   07/14/21  14:15    


 


MCH  31 pg (28-32)   07/14/21  14:15    


 


MCHC  36 % (32-34)  H  07/14/21  14:15    


 


RDW  16.0 % (13.2-15.2)  H  07/14/21  14:15    


 


Plt Count  154 K/mm3 (140-440)   07/14/21  14:15    


 


Lymph % (Auto)  11.7 % (13.4-35.0)  L  07/14/21  14:15    


 


Mono % (Auto)  4.6 % (0.0-7.3)   07/14/21  14:15    


 


Eos % (Auto)  0.7 % (0.0-4.3)   07/14/21  14:15    


 


Baso % (Auto)  0.3 % (0.0-1.8)   07/14/21  14:15    


 


Lymph # (Auto)  1.6 K/mm3 (1.2-5.4)   07/14/21  14:15    


 


Mono # (Auto)  0.6 K/mm3 (0.0-0.8)   07/14/21  14:15    


 


Eos # (Auto)  0.1 K/mm3 (0.0-0.4)   07/14/21  14:15    


 


Baso # (Auto)  0.0 K/mm3 (0.0-0.1)   07/14/21  14:15    


 


Seg Neutrophils %  82.7 % (40.0-70.0)  H  07/14/21  14:15    


 


Seg Neutrophils #  11.4 K/mm3 (1.8-7.7)  H  07/14/21  14:15    


 


PT  13.3 Sec. (12.2-14.9)   07/14/21  14:15    


 


INR  0.96  (0.87-1.13)   07/14/21  14:15    


 


APTT  25.5 Sec. (24.2-36.6)   07/14/21  14:15    


 


VBG pH  7.438  (7.320-7.420)  H  07/14/21  14:41    


 


Sodium  132 mmol/L (137-145)  L  07/14/21  14:15    


 


Potassium  3.3 mmol/L (3.6-5.0)  L  07/14/21  14:15    


 


Chloride  87.0 mmol/L ()  L  07/14/21  14:15    


 


Carbon Dioxide  32 mmol/L (22-30)  H  07/14/21  14:15    


 


Anion Gap  16 mmol/L  07/14/21  14:15    


 


BUN  12 mg/dL (9-20)   07/14/21  14:15    


 


Creatinine  0.5 mg/dL (0.8-1.3)  L  07/14/21  14:15    


 


Estimated GFR  > 60 ml/min  07/14/21  14:15    


 


BUN/Creatinine Ratio  24 %  07/14/21  14:15    


 


Glucose  439 mg/dL ()  H  07/14/21  14:15    


 


POC Glucose  285 mg/dL ()  H  07/15/21  06:57    


 


Lactic Acid  0.90 mmol/L (0.7-2.0)   07/14/21  17:08    


 


Calcium  9.5 mg/dL (8.4-10.2)   07/14/21  14:15    


 


Magnesium  1.10 mg/dL (1.7-2.3)  L  07/14/21  14:15    


 


Total Bilirubin  1.00 mg/dL (0.1-1.2)   07/14/21  14:15    


 


AST  295 units/L (5-40)  H  07/14/21  14:15    


 


ALT  226 units/L (7-56)  H  07/14/21  14:15    


 


Alkaline Phosphatase  1367 units/L ()  H  07/14/21  14:15    


 


Troponin T  < 0.010 ng/mL (0.00-0.029)   07/14/21  14:15    


 


Total Protein  7.3 g/dL (6.3-8.2)   07/14/21  14:15    


 


Albumin  3.7 g/dL (3.9-5)  L  07/14/21  14:15    


 


Albumin/Globulin Ratio  1.0 %  07/14/21  14:15    


 


Lipase  28 units/L (13-60)   07/14/21  14:15    


 


Urine Color  Straw  (Yellow)   07/14/21  19:22    


 


Urine Turbidity  Clear  (Clear)   07/14/21  19:22    


 


Urine pH  7.0  (5.0-7.0)   07/14/21  19:22    


 


Ur Specific Gravity  1.022  (1.003-1.030)   07/14/21  19:22    


 


Urine Protein  <15 mg/dl mg/dL (Negative)   07/14/21  19:22    


 


Urine Glucose (UA)  >=500 mg/dL (Negative)   07/14/21  19:22    


 


Urine Ketones  Tr mg/dL (Negative)   07/14/21  19:22    


 


Urine Blood  Neg  (Negative)   07/14/21  19:22    


 


Urine Nitrite  Neg  (Negative)   07/14/21  19:22    


 


Urine Bilirubin  Neg  (Negative)   07/14/21  19:22    


 


Urine Urobilinogen  < 2.0 mg/dL (<2.0)   07/14/21  19:22    


 


Ur Leukocyte Esterase  Neg  (Negative)   07/14/21  19:22    


 


Urine WBC (Auto)  < 1.0 /HPF (0.0-6.0)   07/14/21  19:22    


 


Urine RBC (Auto)  1.0 /HPF (0.0-6.0)   07/14/21  19:22    


 


Urine Opiates Screen  Negative   07/14/21  19:22    


 


Urine Methadone Screen  Negative   07/14/21  19:22    


 


Ur Barbiturates Screen  Negative   07/14/21  19:22    


 


Ur Phencyclidine Scrn  Negative   07/14/21  19:22    


 


Ur Amphetamines Screen  Negative   07/14/21  19:22    


 


U Benzodiazepines Scrn  Negative   07/14/21  19:22    


 


Urine Cocaine Screen  Positive   07/14/21  19:22    


 


U Marijuana (THC) Screen  Negative   07/14/21  19:22    


 


Drugs of Abuse Note  Disclamer   07/14/21  19:22    











Microbiology: 


Microbiology





07/14/21 14:15   Peripheral/Venous   Blood Culture - Preliminary


                            Culture in Progress


07/14/21 14:15   Peripheral/Venous   Blood Culture - Preliminary


                            Culture in Progress











Active Medications





- Current Medications


Current Medications: 














Generic Name Dose Route Start Last Admin





  Trade Name Patrice  PRN Reason Stop Dose Admin


 


Acetaminophen  650 mg  07/14/21 23:15  07/15/21 05:15





  Acetaminophen 325 Mg Tab  PO   650 mg





  Q4H PRN   Administration





  Pain MILD(1-3)/Fever >100.5/HA  


 


Dextrose  50 ml  07/14/21 18:45 





  Dextrose 50% In Water (25gm) 50 Ml Syringe  IV  





  Q30MIN PRN  





  Hypoglycemia  





  Protocol  


 


Insulin Glargine  25 units  07/15/21 21:00 





  Insulin Glargine 100 Units/Ml  SUB-Q  





  QHS [facility]  


 


Insulin Human Isoph/Insulin Regular  15 unit  07/15/21 08:00 





  Insulin Nph/Regular 70/30 Inj  SUB-Q  





  BID [facility]  


 


Insulin Human Lispro  0 unit  07/15/21 07:30 





  Insulin Lispro 100 Unit/Ml  SUB-Q  





  ACHS [facility]  





  Protocol  


 


Magnesium Oxide  400 mg  07/15/21 08:00 





  Magnesium Oxide 400 Mg Tab  PO  





  QDAY [facility]  


 


Miscellaneous Medication  750 mg  07/15/21 08:00 





  Balsalazide Disodium [Colazal]  PO  





  TID [facility]  


 


Miscellaneous Medication  300 mg  07/14/21 23:30 





  Ursodiol [Ursodiol]  PO  





  BID [facility]  


 


Nicotine  7 mg  07/15/21 08:00 





  Nicotine 7 Mg/24 Hr Patch  TD  





  QDAY [facility]  


 


Tamsulosin HCl  0.4 mg  07/15/21 08:00 





  Tamsulosin 0.4 Mg Cap  PO  





  QDAY [facility]

## 2021-07-27 NOTE — XRAY REPORT
CHEST 1 VIEW 7/27/2021 9:54 PM



INDICATION / CLINICAL INFORMATION:

TB screening for psych placement.



COMPARISON: 

One view of the chest from 7/14/2021



FINDINGS:



SUPPORT DEVICES: None.

HEART / MEDIASTINUM: No significant abnormality. 

LUNGS / PLEURA: No significant pulmonary abnormality. No significant pleural effusion. No pneumothora
x. 



ADDITIONAL FINDINGS: No significant additional findings.



IMPRESSION:

1. No acute abnormality of the chest.



Signer Name: Guy Ko MD 

Signed: 7/27/2021 11:21 PM

Workstation Name: [id number] [id number]

## 2021-07-27 NOTE — EMERGENCY DEPARTMENT REPORT
<SANDY SIFUENTES - Last Filed: 21 17:30>





ED Psych HPI





- General


Stated Complaint: MH


Time Seen by Provider: 21 16:54





- History of Present Illness


Initial Comments: 





Patient is a 34 years old male with history of schizophrenia and insulin-

dependent diabetes.  Patient brought to the emergency room by his group home 

manager.  She stated that patient was missed for approximately 3 weeks.  She 

stated that patient returned today with confusion and they want him to be 

medically cleared before they can receive him.  Patient has a strong smell of 

alcohol.  Patient denied any suicidal or homicidal ideation.  He denied any 

auditory or visual hallucination.  His home wound manager stated that he is out 

of his medication for a while.


MD Complaint: other


-: Sudden, unknown


Associated Psychiatric Symptoms: suicidal ideation





- Related Data


                                Home Medications











 Medication  Instructions  Recorded  Confirmed  Last Taken


 


Balsalazide Disodium [Colazal] 750 mg PO TID 06/01/20 07/15/21 07/13/21


 


Potassium Chloride [K-Dur] 10 meq PO BID 06/01/20 07/15/21 07/13/21


 


ursodioL [Ursodiol] 300 mg PO BID 06/01/20 07/15/21 07/13/21








                                  Previous Rx's











 Medication  Instructions  Recorded  Last Taken  Type


 


Acetaminophen [Acetaminophen TAB] 650 mg PO Q4H PRN  tablet 20 Rx


 


Zinc Oxide 1 applic TP BID #7 tube 06/13/20 07/15/21 03:49 Rx


 


oxyCODONE /ACETAMINOPHEN [Percocet 1 tab PO Q6H PRN #14 tablet 20

 Rx





5/325 mg]    


 


Potassium Chloride 20 meq PO QDAY #14 packet 07/15/20 07/13/21 Rx


 


Acetaminophen [Acetaminophen TAB] 650 mg PO Q4H PRN  tablet 20 Rx


 


Insulin Detemir [Levemir VIAL] 25 unit SQ QHS #1 vial 20 Rx


 


Insulin NPH/Regular [NovoLIN 70/30] 4 unit SUB-Q BID #1 vial 20 

Rx


 


Loperamide [Imodium] 2 mg PO TID #90 capsule 20 Rx


 


Magnesium Oxide [Mag-Ox] 400 mg PO QDAY #14 tablet 20 Rx


 


Nicotine [Habitrol] 7 mg TD QDAY #30 patch 20 Rx


 


OLANzapine [ZyPREXA] 5 mg PO DAILY #30 tablet 20 Rx


 


Tamsulosin [Flomax] 0.4 mg PO QDAY #30 cap 20 Rx











                                    Allergies











Allergy/AdvReac Type Severity Reaction Status Date / Time


 


Sulfa (Sulfonamide Allergy  Rash Verified 20 09:28





Antibiotics)     














ED Review of Systems


Comment: All other systems reviewed and negative


Constitutional: denies: chills, fever


Respiratory: denies: cough, shortness of breath, SOB with exertion


Cardiovascular: denies: chest pain, palpitations


Gastrointestinal: denies: abdominal pain, nausea, vomiting


Musculoskeletal: denies: back pain


Neurological: denies: headache, weakness, numbness, paresthesias


Psychiatric: denies: anxiety, depression, auditory hallucinations, visual 

hallucinations, homicidal thoughts





ED Past Medical Hx





- Past Medical History


Hx Hypertension: No


Hx Heart Attack/AMI: No


Hx Congestive Heart Failure: No


Hx Diabetes: Yes


Hx Deep Vein Thrombosis: No


Hx Pulmonary Embolism: No


Hx Liver Disease: No


Hx Renal Disease: No


Hx Sickle Cell Disease: No


Hx Arthritis: No


Hx Seizures: No


Hx Asthma: No


Hx COPD: No


Hx HIV: No


Additional medical history: Left perianal abscess





- Surgical History


Hx Pacemaker: No


Hx Internal Defibrillator: No


Hx Cholecystectomy: No


Hx Appendectomy: No


Additional Surgical History: Incision and drainage perianal abscess





- Social History


Smoking Status: Current Every Day Smoker





- Medications


Home Medications: 


                                Home Medications











 Medication  Instructions  Recorded  Confirmed  Last Taken  Type


 


Balsalazide Disodium [Colazal] 750 mg PO TID 06/01/20 07/15/21 07/13/21 History


 


Potassium Chloride [K-Dur] 10 meq PO BID 06/01/20 07/15/21 07/13/21 History


 


ursodioL [Ursodiol] 300 mg PO BID 06/01/20 07/15/21 07/13/21 History


 


Acetaminophen [Acetaminophen TAB] 650 mg PO Q4H PRN  tablet 06/05/20 07/15/21 

07/13/21 Rx


 


Zinc Oxide 1 applic TP BID #7 tube 06/13/20 07/15/21 07/15/21 03:49 Rx


 


oxyCODONE /ACETAMINOPHEN [Percocet 1 tab PO Q6H PRN #14 tablet 07/05/20 07/15/21

 07/13/21 Rx





5/325 mg]     


 


Potassium Chloride 20 meq PO QDAY #14 packet 07/15/20 07/15/21 07/13/21 Rx


 


Acetaminophen [Acetaminophen TAB] 650 mg PO Q4H PRN  tablet 08/19/20 07/15/21 

07/13/21 Rx


 


Insulin Detemir [Levemir VIAL] 25 unit SQ QHS #1 vial 08/19/20 07/15/21 07/13/21

Rx


 


Insulin NPH/Regular [NovoLIN 70/30] 4 unit SUB-Q BID #1 vial 08/19/20 07/15/21 

07/12/21 Rx


 


Loperamide [Imodium] 2 mg PO TID #90 capsule 08/19/20 07/15/21 07/13/21 Rx


 


Magnesium Oxide [Mag-Ox] 400 mg PO QDAY #14 tablet 08/19/20 07/15/21 07/13/21 Rx


 


Nicotine [Habitrol] 7 mg TD QDAY #30 patch 08/19/20 07/15/21 07/13/21 Rx


 


OLANzapine [ZyPREXA] 5 mg PO DAILY #30 tablet 08/19/20 07/15/21 07/13/21 Rx


 


Tamsulosin [Flomax] 0.4 mg PO QDAY #30 cap 08/19/20 07/15/21 07/13/21 Rx














ED Physical Exam





- General


General appearance: alert, in no apparent distress, anxious





- Head


Head exam: Present: atraumatic, normocephalic, normal inspection





- Eye


Eye exam: Present: normal appearance, PERRL





- ENT


ENT exam: Present: normal exam, normal orophraynx, mucous membranes moist





- Neck


Neck exam: Present: normal inspection, full ROM.  Absent: tenderness, 

meningismus





- Respiratory


Respiratory exam: Present: normal lung sounds bilaterally





- Cardiovascular


Cardiovascular Exam: Present: regular rate, normal rhythm, normal heart sounds





- GI/Abdominal


GI/Abdominal exam: Present: soft, normal bowel sounds.  Absent: distended, 

tenderness, guarding, rebound, rigid, organomegaly, mass, bruit, pulsatile mass,

 hernia





- Extremities Exam


Extremities exam: Present: normal inspection, full ROM, normal capillary refill.

  Absent: tenderness, pedal edema, joint swelling, calf tenderness





- Back Exam


Back exam: Present: normal inspection, full ROM.  Absent: CVA tenderness (R), 

CVA tenderness (L)





- Neurological Exam


Neurological exam: Present: alert, oriented X3, CN II-XII intact, normal gait, 

reflexes normal.  Absent: motor sensory deficit





- Psychiatric


Psychiatric exam: Present: normal mood.  Absent: homicidal ideation





- Skin


Skin exam: Present: warm, intact, normal color





ED Disposition


Clinical Impression: 


 Medical clearance for psychiatric admission, Cocaine use, Schizophrenia





Disposition: DC-01 TO HOME OR SELFCARE


Condition: Stable


Instructions:  Schizophrenia, Managing Schizophrenia, Stimulant Use Disorder-

Cocaine


Additional Instructions: 


Professional and Agency Contacts To help Resolve Crises()


[place] Crisis Line: 1-787.580.2202


Suicide Prevention Line: 1-169.916.8705


Crisis Text Line: Text START to 779799


Emergency: 911





Outpatient COMMUNITY Behavioral Health Resources:





[place]: [facility]


[address] [place], [place] [address] 


Phone: [phone]





[address] [address]


Phone: [phone]





[place]: [facility] -


[address] [place] [address]


Phone: [phone]


Monday thru Friday - 8am - 5pm





[facility]


Address: [address] [place] [address]


Phone: [phone]





[place]:


[facility]


Address: [address] [place] [address]


Monday thru Friday- 7am-2pm


Phone: [phone]





[facility]


Address: [address] [place] [address]


Monday thru Friday: 8:30AM-5PM


Phone: [phone]








OUTPATIENT MENTAL HEALTH RESOURCES





[facility],


[address] [place], [place] [address] [phone]





[facility] Moe Salamanca MD:


[address]


[place] [address] [phone]





[facility]:


[address]


[place] [address]


[phone]





[facility] [address]


 [place], [place] [address] [phone]





[facility]:


[address]


[place] [address]


[phone]





[facility]:


 [address] [place] [address]


 [phone]





[facility]:


[address] [place], [place]


[phone]





Jeff Adair MD:


  [address] [address] [phone]





[facility]:


[address], [place] [address] [phone]





**[place] CRISIS AND ACCESS LINE: 1 [phone]*








In case of an emergency, please contact the following numbers:


[place] Crisis and Access Line: Number: 0-944-181-3083


Crisis Text Line: (Text START) Number: 559567


Suicide Prevention Line: Number: 0-731-139-6389


Emergency Number: 911





SUBSTANCE ABUSE PROGRAMS:


[facility]:


Location: [place], [place]


Phone: [phone] 





[facility]


Address: [address] [place] [address]


Phone: [phone]





[facility]:


Address: [address] [place] [address]


Phone: [phone]





[facility]:


Address: [address] [place] [address] 


Phone: [phone]





[facility]:


Address: [address] [place] [address]


Phone: [phone]





[facility]


Address: [address] [place] [address]. 


Phone: [phone]





Please contact above numbers to attempt placement into free based program.





Medicaid Programs:





[facility]: 


Address: [address] [place] [address] 


Phone: [phone]





[facility]: 


Address: [address] [place] [address] 


Phone: [phone]


Referrals: 


PRIMARY CARE,MD [Primary Care Provider] - 3-5 Days


[facility] [Outside] - 3-5 Days


[facility] [Provider Group] - 3-5 Days





<LISBETH MERCEDES S - Last Filed: 21 10:43>





ED Review of Systems


ROS: 


Stated complaint: MH


Other details as noted in HPI








ED Course





                                   Vital Signs











  21





  17:24 20:14 22:00


 


Temperature 98.0 F 98.0 F 


 


Pulse Rate 73 100 H 


 


Respiratory 18 18 





Rate   


 


Blood Pressure 111/62 112/70 





[Left]   


 


O2 Sat by Pulse 100 99 98





Oximetry   














  21





  01:45 07:58 22:00


 


Temperature 98.8 F 98.0 F 98.0 F


 


Pulse Rate 82 90 74


 


Respiratory 16 20 20





Rate   


 


Blood Pressure 96/66 110/73 110/80





[Left]   


 


O2 Sat by Pulse 95 100 98





Oximetry   














  21





  01:00 09:46 09:55


 


Temperature 98.2 F  97.9 F


 


Pulse Rate 76  80


 


Respiratory 20  18





Rate   


 


Blood Pressure 116/70  113/74





[Left]   


 


O2 Sat by Pulse 99 99 99





Oximetry   














ED Medical Decision Making





- Medical Decision Making








21  1040 AM:





This patient, with history of schizophrenia, presented to the emergency depa

rtment 2 days ago for a mental health evaluation and medical clearance after he 

was missing from his group home for about 3 weeks and then presented with some 

confusion or altered mental status.  The patient has been evaluated each day by 

the psychiatric team who have signed off on this patient.  There have been no 

events overnight or anything signed out to the emergency department psychiatric 

nurse from this morning.  He is resting comfortably.  He denies any suicidal or 

homicidal ideations.  The patient's urinalysis was unremarkable but UDS positive

 for cocaine.  However he does not appear acutely intoxicated today.  He is 

refusing any blood draws.  He is not exhibiting any signs of acute psychosis at 

this time.  The patient is a gentile of the [facility] and case management has been 

working with the patient and the [facility] for placement.  It appears 

that the patient has been given a new residential placement and will be 

discharged to this location this morning or afternoon.


Critical Care Time: No


Critical care attestation.: 


If time is entered above; I have spent that time in minutes in the direct care 

of this critically ill patient, excluding procedure time.








ED Disposition


Is pt being admited?: No


Time of Disposition: 10:39

## 2021-07-28 NOTE — CONSULTATION
History of Present Illness





- Reason for Consult


Consult date: 07/28/21


Reason for consult: Mental health evaluation





- History of Present Psychiatric Illness


 ED Note: Patient is a 34 years old male with history of schizophrenia and 

insulin-dependent diabetes.  Patient brought to the emergency room by his group 

home manager.  She stated that patient was missed for approximately 3 weeks.  

She stated that patient returned today with confusion and they want him to be 

medically cleared before they can receive him.  Patient has a strong smell of 

alcohol.  Patient denied any suicidal or homicidal ideation.  He denied any 

auditory or visual hallucination.  His home wound manager stated that he is out 

of his medication for a while.











Jaxson Kam is a 34 year male with a history of schizophrenia who present to 

the ED for medical clearance. In my interview with the patient, the patient is 

calm and cooperative. He states " I got a little hot, I did not have a place 

stay and my  sent me here." The patient denies any current suicidal 

ideation and denies hallucinations.





PAST PSYCHIATRIC HISTORY


Diagnoses: Schizophrenia


Suicide attempts or Self-harm behavior: Denies


Prior psychiatric hospitalizations: Yes


Substance Abuse history: Denies


Previous psychiatric medications tried: Zyprexa


Outpatient treatment: Yes





SOCIAL HISTORY


Marital Status: Single


Living Arrangements: homeless


Employment Status: unemployed


Access to guns/weapons: Denied


Education: 9th grade


History of Abuse: Denied


Legal History: None reported





REVIEW OF SYSTEMS


Constitutional: Negative for weight loss


ENT: Negative for stridor


Respiratory: Negative for cough or hemoptysis


All other systems reviewed and are negative





MENTAL STATUS EXAMINATION


General Appearance and Behavior: Age appropriate, dressed appropriately, calm 

and cooperative


Cooperation: Participating


Psychomotor Behavior: psychomotor normal


Mood: calm


Affect and affective range: Congruent with stated mood


Thought Process: Goal directed


Thought Content: Not SI


Speech: Normal volume, Regular rate and rhythm,


Intellectual Functioning: Average


Suicidal Ideation: Denied


Homicidal Ideation: Denied


Hallucinations: Denied


Delusions: None elicited


Impulse Control: Unimpaired


Insight and Judgment: Limited insight and judgment,


Memory: Normal


Attention: Undivided


Orientation: Alert, oriented





 Assessment and Plan 


(1)


(2) 





Treatment plan





Risks, benefits and alternatives of medications discussed with the patient, 

questions answered and consent obtained from patient.


PSYCHOTHERAPY: Supportive psychotherapy provided


MEDICAL: Per primary team


DELIRIUM PRECAUTIONS: Please re-orient patient frequently, keep lights on during

the day, and minimize benzodiazepines and opiates as these medications could 

worsen patient's confusion.


SAFETY SITTER: Per medical team


DISPOSITION:  Do not recommend acute inpatient psychiatric hospitalization.


FOLLOW-UP: Will sign off.


Patient knows to follow up with psychiatric outpatient and to call the crisis 

hotline or go the ER if suicidal/ homicidal or any endangering ideas arise.


Thank you for the consult.  Please contact with any questions and/or concerns.


Case staffed with Dr. Harrington














Medications and Allergies


                                    Allergies











Allergy/AdvReac Type Severity Reaction Status Date / Time


 


Sulfa (Sulfonamide Allergy  Rash Verified 07/07/20 09:28





Antibiotics)     











                                Home Medications











 Medication  Instructions  Recorded  Confirmed  Last Taken  Type


 


Balsalazide Disodium [Colazal] 750 mg PO TID 06/01/20 07/15/21 07/13/21 History


 


Potassium Chloride [K-Dur] 10 meq PO BID 06/01/20 07/15/21 07/13/21 History


 


ursodioL [Ursodiol] 300 mg PO BID 06/01/20 07/15/21 07/13/21 History


 


Acetaminophen [Acetaminophen TAB] 650 mg PO Q4H PRN  tablet 06/05/20 07/15/21 

07/13/21 Rx


 


Zinc Oxide 1 applic TP BID #7 tube 06/13/20 07/15/21 07/15/21 03:49 Rx


 


oxyCODONE /ACETAMINOPHEN [Percocet 1 tab PO Q6H PRN #14 tablet 07/05/20 07/15/21

 07/13/21 Rx





5/325 mg]     


 


Potassium Chloride 20 meq PO QDAY #14 packet 07/15/20 07/15/21 07/13/21 Rx


 


Acetaminophen [Acetaminophen TAB] 650 mg PO Q4H PRN  tablet 08/19/20 07/15/21 

07/13/21 Rx


 


Insulin Detemir [Levemir VIAL] 25 unit SQ QHS #1 vial 08/19/20 07/15/21 07/13/21

Rx


 


Insulin NPH/Regular [NovoLIN 70/30] 4 unit SUB-Q BID #1 vial 08/19/20 07/15/21 

07/12/21 Rx


 


Loperamide [Imodium] 2 mg PO TID #90 capsule 08/19/20 07/15/21 07/13/21 Rx


 


Magnesium Oxide [Mag-Ox] 400 mg PO QDAY #14 tablet 08/19/20 07/15/21 07/13/21 Rx


 


Nicotine [Habitrol] 7 mg TD QDAY #30 patch 08/19/20 07/15/21 07/13/21 Rx


 


OLANzapine [ZyPREXA] 5 mg PO DAILY #30 tablet 08/19/20 07/15/21 07/13/21 Rx


 


Tamsulosin [Flomax] 0.4 mg PO QDAY #30 cap 08/19/20 07/15/21 07/13/21 Rx














Mental Status Exam





- Vital signs


                                Last Vital Signs











Temp  98.0 F   07/28/21 07:58


 


Pulse  90   07/28/21 07:58


 


Resp  20   07/28/21 07:58


 


BP  110/73   07/28/21 07:58


 


Pulse Ox  100   07/28/21 07:58














Results


All other labs normal.